# Patient Record
Sex: FEMALE | Race: WHITE | NOT HISPANIC OR LATINO | Employment: UNEMPLOYED | URBAN - METROPOLITAN AREA
[De-identification: names, ages, dates, MRNs, and addresses within clinical notes are randomized per-mention and may not be internally consistent; named-entity substitution may affect disease eponyms.]

---

## 2023-12-12 ENCOUNTER — OFFICE VISIT (OUTPATIENT)
Dept: CARDIOLOGY | Facility: CLINIC | Age: 58
End: 2023-12-12
Payer: COMMERCIAL

## 2023-12-12 VITALS
WEIGHT: 131 LBS | TEMPERATURE: 97.5 F | HEART RATE: 54 BPM | SYSTOLIC BLOOD PRESSURE: 118 MMHG | BODY MASS INDEX: 22.36 KG/M2 | DIASTOLIC BLOOD PRESSURE: 74 MMHG | HEIGHT: 64 IN

## 2023-12-12 DIAGNOSIS — E78.2 MIXED HYPERLIPIDEMIA: ICD-10-CM

## 2023-12-12 DIAGNOSIS — R94.31 ABNORMAL EKG: Primary | ICD-10-CM

## 2023-12-12 DIAGNOSIS — I10 PRIMARY HYPERTENSION: ICD-10-CM

## 2023-12-12 PROCEDURE — 3074F SYST BP LT 130 MM HG: CPT | Performed by: INTERNAL MEDICINE

## 2023-12-12 PROCEDURE — 3078F DIAST BP <80 MM HG: CPT | Performed by: INTERNAL MEDICINE

## 2023-12-12 PROCEDURE — 1036F TOBACCO NON-USER: CPT | Performed by: INTERNAL MEDICINE

## 2023-12-12 PROCEDURE — 99214 OFFICE O/P EST MOD 30 MIN: CPT | Performed by: INTERNAL MEDICINE

## 2023-12-12 RX ORDER — MINOXIDIL 2 %
1 SOLUTION, NON-ORAL TOPICAL 2 TIMES DAILY
COMMUNITY
Start: 2022-09-21

## 2023-12-12 RX ORDER — METOPROLOL SUCCINATE 100 MG/1
100 TABLET, EXTENDED RELEASE ORAL
Qty: 90 TABLET | Refills: 3 | Status: SHIPPED | OUTPATIENT
Start: 2023-12-12 | End: 2024-05-15 | Stop reason: SDUPTHER

## 2023-12-12 RX ORDER — PAROXETINE 10 MG/1
10 TABLET, FILM COATED ORAL
COMMUNITY
Start: 2023-09-06 | End: 2024-09-05

## 2023-12-12 RX ORDER — METOPROLOL SUCCINATE 100 MG/1
100 TABLET, EXTENDED RELEASE ORAL
COMMUNITY
Start: 2015-02-07 | End: 2023-12-12 | Stop reason: SDUPTHER

## 2023-12-12 RX ORDER — PROGESTERONE 100 MG/1
100 CAPSULE ORAL DAILY
COMMUNITY
Start: 2023-08-24

## 2023-12-12 RX ORDER — TRIAMCINOLONE ACETONIDE 0.25 MG/G
1 CREAM TOPICAL 2 TIMES DAILY
COMMUNITY
Start: 2023-08-13

## 2023-12-12 RX ORDER — ESTRADIOL 0.5 MG/1
0.5 TABLET ORAL
COMMUNITY
Start: 2023-08-24

## 2023-12-12 ASSESSMENT — PAIN SCALES - GENERAL: PAINLEVEL: 0-NO PAIN

## 2023-12-12 NOTE — PROGRESS NOTES
1. Hypertension.  2. Hyperlipidemia.  3. Palpitation     Patient is a pleasant 58-year-old female with the above-noted pertinent past medical history who presents today for follow-up, she has no complaints exertional chest pain or shortness of breath when questioned regarding activity she states that she does 1 hour of Pilati 2-3 times a week and is a walking regimen hiking at least once a week, she has no complaints of orthopnea PND or syncopal episode when questioned regarding palpitation she states that she has occasional short burst very infrequent and no associated symptoms    Vital signs reviewed and stable BMI of 22.5, heart rate of 54 bpm, blood pressure 118/74 mmHg patient is a well-developed well-nourished female in no acute distress speaking full sentences no carotid bruits upstroke and volumes are normal, heart rate and rhythm are regular S1-S2 are normal no gallop or murmurs, lungs are clear to auscultation normal air entry abdomen is scaphoid positive bowel sounds soft and nontender    No new lab work is available    Sensation palpitation, who has been well maintained on metoprolol, most likely due to premature ventricular complexes,  Hypertension under good control  Hyperlipidemia her last cholesterol dates back to November 2022 at which time she has HDL of 71 and LDL of 109, cholesterol profile and CMP requisitions provided  Patient was encouraged to keep up with her activities and exercises  Patient is a non-smoker  Return to my clinic in 1 year.

## 2024-05-15 DIAGNOSIS — I10 PRIMARY HYPERTENSION: ICD-10-CM

## 2024-05-15 DIAGNOSIS — R94.31 ABNORMAL EKG: ICD-10-CM

## 2024-05-15 DIAGNOSIS — E78.2 MIXED HYPERLIPIDEMIA: ICD-10-CM

## 2024-05-16 RX ORDER — METOPROLOL SUCCINATE 100 MG/1
100 TABLET, EXTENDED RELEASE ORAL
Qty: 90 TABLET | Refills: 3 | Status: SHIPPED | OUTPATIENT
Start: 2024-05-16 | End: 2024-06-04

## 2024-06-03 DIAGNOSIS — E78.2 MIXED HYPERLIPIDEMIA: ICD-10-CM

## 2024-06-03 DIAGNOSIS — R94.31 ABNORMAL EKG: ICD-10-CM

## 2024-06-03 DIAGNOSIS — I10 PRIMARY HYPERTENSION: ICD-10-CM

## 2024-06-04 DIAGNOSIS — I10 PRIMARY HYPERTENSION: ICD-10-CM

## 2024-06-04 DIAGNOSIS — R94.31 ABNORMAL EKG: ICD-10-CM

## 2024-06-04 DIAGNOSIS — E78.2 MIXED HYPERLIPIDEMIA: ICD-10-CM

## 2024-06-04 RX ORDER — METOPROLOL SUCCINATE 100 MG/1
100 TABLET, EXTENDED RELEASE ORAL DAILY
Qty: 90 TABLET | Refills: 3 | Status: SHIPPED | OUTPATIENT
Start: 2024-06-04 | End: 2025-06-04

## 2024-06-05 RX ORDER — METOPROLOL SUCCINATE 100 MG/1
100 TABLET, EXTENDED RELEASE ORAL DAILY
Qty: 90 TABLET | Refills: 3 | Status: CANCELLED | OUTPATIENT
Start: 2024-06-05 | End: 2025-06-05

## 2024-12-10 ENCOUNTER — APPOINTMENT (OUTPATIENT)
Dept: CARDIOLOGY | Facility: CLINIC | Age: 59
End: 2024-12-10
Payer: COMMERCIAL

## 2025-01-08 PROBLEM — G47.00 INSOMNIA: Status: ACTIVE | Noted: 2023-08-07

## 2025-01-08 PROBLEM — R55 SYNCOPE AND COLLAPSE: Status: ACTIVE | Noted: 2025-01-08

## 2025-01-08 PROBLEM — F41.9 ANXIETY: Status: ACTIVE | Noted: 2023-08-07

## 2025-01-08 PROBLEM — R07.9 CHEST PAIN: Status: ACTIVE | Noted: 2025-01-08

## 2025-01-08 PROBLEM — E78.5 HYPERLIPIDEMIA: Status: ACTIVE | Noted: 2023-02-07

## 2025-01-09 ENCOUNTER — APPOINTMENT (OUTPATIENT)
Dept: CARDIOLOGY | Facility: CLINIC | Age: 60
End: 2025-01-09
Payer: COMMERCIAL

## 2025-01-09 VITALS
SYSTOLIC BLOOD PRESSURE: 122 MMHG | DIASTOLIC BLOOD PRESSURE: 90 MMHG | TEMPERATURE: 97.5 F | WEIGHT: 141.8 LBS | HEART RATE: 58 BPM | HEIGHT: 64 IN | BODY MASS INDEX: 24.21 KG/M2

## 2025-01-09 DIAGNOSIS — I10 PRIMARY HYPERTENSION: ICD-10-CM

## 2025-01-09 DIAGNOSIS — R07.9 CHEST PAIN, UNSPECIFIED TYPE: ICD-10-CM

## 2025-01-09 DIAGNOSIS — I10 ESSENTIAL HYPERTENSION, BENIGN: Primary | ICD-10-CM

## 2025-01-09 DIAGNOSIS — R00.1 SINUS BRADYCARDIA: ICD-10-CM

## 2025-01-09 DIAGNOSIS — R94.31 ABNORMAL EKG: ICD-10-CM

## 2025-01-09 DIAGNOSIS — E78.2 MIXED HYPERLIPIDEMIA: ICD-10-CM

## 2025-01-09 DIAGNOSIS — R00.2 PALPITATIONS: ICD-10-CM

## 2025-01-09 PROBLEM — N95.1 MENOPAUSAL SYMPTOMS: Status: ACTIVE | Noted: 2023-08-07

## 2025-01-09 PROBLEM — H90.71 MIXED CONDUCTIVE AND SENSORINEURAL HEARING LOSS OF RIGHT EAR WITH UNRESTRICTED HEARING OF LEFT EAR: Status: ACTIVE | Noted: 2019-02-13

## 2025-01-09 PROBLEM — H93.13 TINNITUS, BILATERAL: Status: ACTIVE | Noted: 2019-02-13

## 2025-01-09 PROCEDURE — 99214 OFFICE O/P EST MOD 30 MIN: CPT | Performed by: INTERNAL MEDICINE

## 2025-01-09 PROCEDURE — 3080F DIAST BP >= 90 MM HG: CPT | Performed by: INTERNAL MEDICINE

## 2025-01-09 PROCEDURE — 3074F SYST BP LT 130 MM HG: CPT | Performed by: INTERNAL MEDICINE

## 2025-01-09 PROCEDURE — 1036F TOBACCO NON-USER: CPT | Performed by: INTERNAL MEDICINE

## 2025-01-09 PROCEDURE — 3008F BODY MASS INDEX DOCD: CPT | Performed by: INTERNAL MEDICINE

## 2025-01-09 PROCEDURE — 93000 ELECTROCARDIOGRAM COMPLETE: CPT | Performed by: INTERNAL MEDICINE

## 2025-01-09 RX ORDER — FLUTICASONE PROPIONATE 50 MCG
2 SPRAY, SUSPENSION (ML) NASAL
COMMUNITY
Start: 2024-04-18 | End: 2025-01-09 | Stop reason: WASHOUT

## 2025-01-09 RX ORDER — GABAPENTIN 100 MG/1
CAPSULE ORAL
COMMUNITY
Start: 2024-04-05 | End: 2025-01-09 | Stop reason: WASHOUT

## 2025-01-09 RX ORDER — PROGESTERONE 100 MG/1
100 CAPSULE ORAL DAILY
COMMUNITY

## 2025-01-09 RX ORDER — METOPROLOL SUCCINATE 100 MG/1
100 TABLET, EXTENDED RELEASE ORAL DAILY
Qty: 90 TABLET | Refills: 3 | Status: SHIPPED | OUTPATIENT
Start: 2025-01-09 | End: 2026-01-09

## 2025-01-09 NOTE — PROGRESS NOTES
"  Patient:  Desire Williamson  YOB: 1965  MRN: [id number]       Chief Complaint/Active Symptoms:       Desire Williamson is a 59 y.o. female who returns today for cardiac follow-up.    The patient is here for annual follow-up of hypertension hyperlipidemia and history of palpitations.    No chest pain or discomfort. No shortness of breath, orthopnea or PND. Has rare palpitations described as a \"double beat\" that is very brief without any associated symptoms. No edema, claudications.     No questions or concerns.     Primary care physician is Dr. Becker of [facility].       Review of Systems   All other systems reviewed and are negative.      Objective:     Vitals:    01/09/25 1406   BP: 122/90   Pulse: 58   Temp: 36.4 °C (97.5 °F)       Vitals:    01/09/25 1406   Weight: 64.3 kg (141 lb 12.8 oz)       Allergies:     No Known Allergies       Medications:     Current Outpatient Medications   Medication Instructions    estradiol (ESTRACE) 0.5 mg, Daily RT    fluticasone (Flonase) 50 mcg/actuation nasal spray 2 sprays, Daily RT    gabapentin (Neurontin) 100 mg capsule     metoprolol succinate XL (TOPROL-XL) 100 mg, oral, Daily    minoxidil (Rogaine) 2 % external solution 1 Application, 2 times daily    PARoxetine (PAXIL) 10 mg, oral, Daily RT    progesterone (PROMETRIUM) 100 mg, Daily    progesterone (PROMETRIUM) 100 mg, oral, Daily    triamcinolone (Kenalog) 0.025 % cream 1 Application, 2 times daily       Physical Examination:   GENERAL:  Well developed, well nourished, in no acute distress.  HEENT: NC AT, EOMI with anicteric sclera  NECK:  Supple, no JVD, no bruit.  CHEST:  Symmetric and nontender.  LUNGS:  Clear to auscultation bilaterally, normal respiratory effort.  HEART:  PMI is nondisplaced. RRR with normal S1 and S2, no S3, no mumur or rub. No carotid or abdominal bruits  ABDOMEN: Soft, NT, ND without palpable organomegaly or bruits  EXTREMITIES:  Warm with good color, no clubbing or cyanosis.  There is no " "edema noted.  PERIPHERAL VASCULAR:  Pulses present and equally palpable; 2+ throughout.  MUSCULOSKELETAL: no significant abnormalities.   NEURO/PSYCH:  Alert and oriented times three with approppriate behavior and responses. Nonfocal motor examination with normal gait and ambulation  Lymph: No significant palpable lymphadenopathy  Skin: no rash or lesions on exposed skin or reported.    Lab:     CBC:   Lab Results   Component Value Date    WBC 4.3 (L) 11/17/2022    RBC 4.45 11/17/2022    HGB 15.3 11/17/2022    HCT 43.3 11/17/2022     11/17/2022        CMP:    Lab Results   Component Value Date     11/17/2022    K 3.6 11/17/2022    CL 99 11/17/2022    CO2 30 11/17/2022    BUN 19 11/17/2022    CREATININE 1.00 11/17/2022    GLUCOSE 82 11/17/2022    CALCIUM 9.8 11/17/2022       Magnesium:    No results found for: \"MG\"    Lipid Profile:    Lab Results   Component Value Date    TRIG 108 11/17/2022    HDL 71.1 11/17/2022       TSH:    No results found for: \"TSH\"    BNP:   No results found for: \"BNP\"     PT/INR:    No results found for: \"PROTIME\", \"INR\"    HgBA1c:    No results found for: \"HGBA1C\"    BMP:  Lab Results   Component Value Date     11/17/2022     11/10/2020     11/05/2019    K 3.6 11/17/2022    K 4.3 11/10/2020    K 4.2 11/05/2019    CL 99 11/17/2022     11/10/2020    CL 99 11/05/2019    CO2 30 11/17/2022    CO2 31 11/10/2020    CO2 31 11/05/2019    BUN 19 11/17/2022    BUN 18 11/10/2020    BUN 21 11/05/2019    CREATININE 1.00 11/17/2022    CREATININE 0.79 11/10/2020    CREATININE 0.84 11/05/2019       Cardiac Enzymes:    No results found for: \"TROPHS\"    Hepatic Function Panel:    Lab Results   Component Value Date    ALKPHOS 55 11/17/2022    ALT 17 11/17/2022    AST 24 11/17/2022    PROT 8.1 11/17/2022    BILITOT 0.8 11/17/2022     Labs from the [facility] 2024 blood reviewed.  LDL cholesterol was 138.    Diagnostic Studies:     No echocardiogram results found for the " past 12 months echocardiogram in 2022 showed normal LV function.    No nuclear medicine results found for the past 12 months    EKG:   Sinus bradycardia at 55 bpm, LAE, otherwise normal     Radiology:     No orders to display     ASSESSMENT     Problem List Items Addressed This Visit       RESOLVED: Chest pain    Essential hypertension, benign - Primary    Relevant Orders    ECG 12 lead (Clinic Performed) (Completed)    Comprehensive Metabolic Panel    CBC and Auto Differential    Hyperlipidemia    Relevant Medications    metoprolol succinate XL (Toprol-XL) 100 mg 24 hr tablet    Other Relevant Orders    Lipid Panel    CT cardiac scoring wo IV contrast    Follow Up In Cardiology    Palpitations    Relevant Orders    ECG 12 lead (Clinic Performed) (Completed)    Sinus bradycardia    Relevant Medications    metoprolol succinate XL (Toprol-XL) 100 mg 24 hr tablet     Other Visit Diagnoses       Abnormal EKG        Relevant Medications    metoprolol succinate XL (Toprol-XL) 100 mg 24 hr tablet    Primary hypertension        Relevant Medications    metoprolol succinate XL (Toprol-XL) 100 mg 24 hr tablet    Other Relevant Orders    CT cardiac scoring wo IV contrast    Follow Up In Cardiology            PLAN     1.  Benign essential hypertension.  Blood pressures are borderline with a diastolic of 90 today.  Usually her blood pressures are better controlled.  If her diastolic hypertension persist she may benefit from restarting a thiazide type diuretic.  2.  History of palpitations.  She has rare benign sounding palpitations.  Obviously her symptoms responded to beta-blocker therapy.  She now has some sinus bradycardia.  I think it would be reasonable depending on her symptoms and need for blood pressure management to decrease the dose of her metoprolol succinate to 50 mg a day and then consider adding an ACE inhibitor or ARB with hydrochlorothiazide or not if she becomes more hypertensive.  3.  Hyperlipidemia.  Review of  her labs from the [facility] last year showed her LDL cholesterol was elevated.  Because of her postmenopausal state, hypertension and hyperlipidemia have suggest she have a CT cardiac score.  If her CT cardiac score is greater than 100 I would recommend initiating statin therapy.  If its greater than 800 I would recommend stress testing for risk stratification in the absence of symptoms.  4.  Sinus bradycardia.  As discussed above if it becomes symptomatic or problematic we will consider decreasing the dose of her metoprolol succinate to 50 mg a day.  5.  History of chest pain.  She states she has not had any chest discomfort in several years would follow her clinically but the suggestion of the CT cardiac score as discussed above.    As long as she is doing well and her CT cardiac score is not high risk we will see her in follow-up in a years time sooner if there is any questions or concerns.  As I discussed above her primary care physician should feel free to decrease her metoprolol and add a true hypertensive medication if clinically appropriate based on blood pressure readings in the office.

## 2025-06-10 ENCOUNTER — APPOINTMENT (OUTPATIENT)
Dept: RADIOLOGY | Facility: CLINIC | Age: 60
End: 2025-06-10
Payer: COMMERCIAL

## 2025-06-17 ENCOUNTER — HOSPITAL ENCOUNTER (OUTPATIENT)
Dept: RADIOLOGY | Facility: CLINIC | Age: 60
Discharge: HOME | End: 2025-06-17
Payer: COMMERCIAL

## 2025-06-17 DIAGNOSIS — E78.2 MIXED HYPERLIPIDEMIA: ICD-10-CM

## 2025-06-17 DIAGNOSIS — I10 PRIMARY HYPERTENSION: ICD-10-CM

## 2025-06-17 PROCEDURE — 75571 CT HRT W/O DYE W/CA TEST: CPT

## 2025-06-30 DIAGNOSIS — R94.31 ABNORMAL EKG: ICD-10-CM

## 2025-06-30 DIAGNOSIS — E78.2 MIXED HYPERLIPIDEMIA: ICD-10-CM

## 2025-06-30 DIAGNOSIS — I10 PRIMARY HYPERTENSION: ICD-10-CM

## 2025-06-30 RX ORDER — METOPROLOL SUCCINATE 100 MG/1
100 TABLET, EXTENDED RELEASE ORAL DAILY
Qty: 90 TABLET | Refills: 3 | Status: SHIPPED | OUTPATIENT
Start: 2025-06-30 | End: 2026-06-30

## 2026-01-22 ENCOUNTER — APPOINTMENT (OUTPATIENT)
Dept: CARDIOLOGY | Facility: CLINIC | Age: 61
End: 2026-01-22
Payer: COMMERCIAL